# Patient Record
Sex: MALE | Race: WHITE | NOT HISPANIC OR LATINO | Employment: OTHER | URBAN - METROPOLITAN AREA
[De-identification: names, ages, dates, MRNs, and addresses within clinical notes are randomized per-mention and may not be internally consistent; named-entity substitution may affect disease eponyms.]

---

## 2017-04-25 ENCOUNTER — OFFICE VISIT (OUTPATIENT)
Dept: PULMONOLOGY | Facility: CLINIC | Age: 77
End: 2017-04-25

## 2017-04-25 VITALS
TEMPERATURE: 98.4 F | HEART RATE: 64 BPM | BODY MASS INDEX: 26.22 KG/M2 | SYSTOLIC BLOOD PRESSURE: 139 MMHG | RESPIRATION RATE: 16 BRPM | HEIGHT: 68 IN | DIASTOLIC BLOOD PRESSURE: 78 MMHG | WEIGHT: 173 LBS | OXYGEN SATURATION: 91 %

## 2017-04-25 DIAGNOSIS — J84.112 IDIOPATHIC PULMONARY FIBROSIS (HCC): Primary | ICD-10-CM

## 2017-04-25 DIAGNOSIS — Z99.81 DEPENDENCE ON SUPPLEMENTAL OXYGEN: ICD-10-CM

## 2017-04-25 PROCEDURE — 99214 OFFICE O/P EST MOD 30 MIN: CPT | Performed by: INTERNAL MEDICINE

## 2017-04-25 NOTE — PATIENT INSTRUCTIONS
1. Continue to take Perfenidone  2. Continue to have your lab checked by Dr. Fajardo  3. Please ask Dr. Quiroz to send me copies of any evaluation you have (lab studies, stress test, echoes, etc.)  4. I will see you in 4 months and we will obtain a chest x-ray and a breathing test on that date  5. We need to make sure that you have a flu vaccine performed this fall

## 2017-04-25 NOTE — PROGRESS NOTES
Subjective   Zechariah Delarosa is a 77 y.o.  white male former smoker who quit in 1982 (3 packs per day for 25 years). I have followed since 5/18/12 when he was originally sent by Dr. Fajardo and Dr. Quiroz for prominent interstitial markings on chest x-ray and complaints of dyspnea. He had a diffusion impairment, a restrictive defect, and exercise-induced hypoxemia. It was felt that he had IPF over the course was very slow. I initially just began him on some low-dose steroids because he felt so asymptomatic, but when his lung volumes and diffusion capacity continued drop I was eventually able to get him to start Perfenidone. Was actually started 2/3/15 but he subsequently went off of it, only to have it restarted 4/1/15. Her on Perfenidone his status appears fairly stable and when last seen in the office by our nurse practitioner 11/8/16 his pulmonary function studies were stable without any loss. He has oxygen at home which she is to use at night and with activity but he is often noncompliant when he goes outside to walk about, cut his grass etc. He has a pulse oximeter as well but he frequently does not use it.  When last seen I outlined his problems as follows  1. IPF  A. On low-dose prednisone 5 mg daily  B. Remains on Perfenidone since 4/1/15  C. On home oxygen at night but noncompliant with activity  D. Diagnosis based on CT scan and PFTs. No open lung biopsy  E. Mild restrictive defect but severe diffusion impairment  2. History of coronary artery disease  A. MI and stent placement 2002  3. History of cigarette abuse but no obstructive defect  4. Hiatal hernia and GERD  5. Hypertension and hyperlipidemia  6. 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 Dupuytren's contracture    History of Present Illness   Patient has had no problems since last seen in the office 11/8/16. He is chronically short of breath but feels no worse than usual. Denies fever, chills, sweats, anorexia, weight loss. Does  have chronic trace to 1+ lower extremity edema. Tells me he saw Dr. Quiroz about a month ago and was told that his EKG had changed. He is scheduled for a stress test and a number of other studies in about 10 days. The last labs I have available from Dr. Fajardo her CBC and CMP from 2/1/17 and look good    The following portions of the patient's history were reviewed and updated as appropriate: allergies, current medications, past family history, past medical history, past social history, past surgical history and problem list.    Review of Systems   Constitutional: Negative for appetite change, chills, diaphoresis, fatigue, fever and unexpected weight change.   HENT: Negative for congestion, ear discharge, ear pain, hearing loss, postnasal drip, rhinorrhea, sinus pressure, sneezing, sore throat, trouble swallowing and voice change.    Eyes: Negative for visual disturbance.   Respiratory: Positive for shortness of breath. Negative for cough, choking, chest tightness, wheezing and stridor.    Cardiovascular: Negative for chest pain, palpitations and leg swelling.   Gastrointestinal: Negative for abdominal pain, anal bleeding, blood in stool, constipation, diarrhea, nausea and vomiting.   Endocrine: Negative for cold intolerance, heat intolerance, polydipsia and polyuria.   Genitourinary: Negative for decreased urine volume, difficulty urinating, dysuria, frequency, hematuria and urgency.   Musculoskeletal: Negative for arthralgias, back pain, joint swelling and myalgias.   Skin: Negative for pallor and rash.   Allergic/Immunologic: Negative for environmental allergies, food allergies and immunocompromised state.   Neurological: Negative for dizziness, seizures, syncope, speech difficulty, weakness and headaches.   Hematological: Negative for adenopathy. Does not bruise/bleed easily.   Psychiatric/Behavioral: Negative for confusion, decreased concentration and sleep disturbance. The patient is not nervous/anxious.    All  "other systems reviewed and are negative.      Prior to Admission medications    Medication Sig Start Date End Date Taking? Authorizing Provider   aspirin 81 MG tablet Take 1 tablet by mouth daily. 7/1/13  Yes Historical Provider, MD   atorvastatin (LIPITOR) 10 MG tablet Take 1 tablet by mouth every night. 6/25/15  Yes Historical Provider, MD   lisinopril (PRINIVIL,ZESTRIL) 10 MG tablet Take 1 tablet by mouth daily. 7/1/13  Yes Historical Provider, MD   metFORMIN (GLUCOPHAGE) 500 MG tablet Take 500 mg by mouth. Take 1 tablet daily. 10/21/15  Yes Robby Kennedy MD   metoprolol tartrate (LOPRESSOR) 25 MG tablet Take 25 mg by mouth daily.   Yes Historical Provider, MD   nitroglycerin (NITROSTAT) 0.4 MG SL tablet Place  under the tongue. Dissolve 1 tablet under the tongue as needed for chest pain. 7/1/13  Yes Historical Provider, MD   Omega-3 Fatty Acids (FISH OIL) 1000 MG capsule capsule Take  by mouth. 7/1/13  Yes Historical Provider, MD   omeprazole (PriLOSEC) 40 MG capsule Take 1 capsule by mouth daily. 6/8/13  Yes Historical Provider, MD   OXYGEN-HELIUM IN Oxygen; Patient Sig: Oxygen 2L q HS; 0; 25-Jun-2015; Active 6/25/15  Yes Historical Provider, MD   Pirfenidone 267 MG capsule Take 3 capsules by mouth 3 (three) times a day. 6/25/15  Yes Historical Provider, MD   predniSONE (DELTASONE) 5 MG tablet Take 1 tablet by mouth Daily. 11/8/16  Yes NATI Sears       Objective   Blood pressure 139/78, pulse 64, temperature 98.4 °F (36.9 °C), resp. rate 16, height 68\" (172.7 cm), weight 173 lb (78.5 kg), SpO2 91 %.    Flowsheet Rows         First Filed Value    Admission Height  68\" (172.7 cm) Documented at 04/25/2017 1423    Admission Weight  173 lb (78.5 kg) Documented at 04/25/2017 1423        Physical Exam   Constitutional: He is oriented to person, place, and time. He appears well-developed and well-nourished.   Thin elderly white male in no acute distress   HENT:   Head: Normocephalic and atraumatic. "   Right Ear: Hearing and external ear normal.   Left Ear: Hearing and external ear normal.   Nose: Nose normal.   Mouth/Throat: Oropharynx is clear and moist. No oropharyngeal exudate.   Bilateral hearing aids in place   Eyes: Conjunctivae and EOM are normal. Pupils are equal, round, and reactive to light. Right eye exhibits no discharge. Left eye exhibits no discharge. No scleral icterus.   Neck: Normal range of motion. Neck supple. No JVD present. No tracheal deviation present. No thyromegaly present.   Cardiovascular: Normal rate, regular rhythm, normal heart sounds and intact distal pulses.  Exam reveals no gallop and no friction rub.    No murmur heard.  Pulmonary/Chest: Effort normal. No accessory muscle usage or stridor. No apnea, no tachypnea and no bradypnea. No respiratory distress. He has no wheezes. He has no rhonchi. He has rales (coarse bilateral anterior and posterior rales. ). He exhibits no tenderness.   Abdominal: Soft. Bowel sounds are normal. He exhibits no distension and no mass. There is no splenomegaly or hepatomegaly. There is no tenderness. There is no rebound and no guarding. No hernia.   Musculoskeletal: Normal range of motion. He exhibits no edema or tenderness.   Dupuytren's contracture right hand   Lymphadenopathy:        Head (right side): No submandibular adenopathy present.        Head (left side): No submandibular adenopathy present.     He has no cervical adenopathy.        Right: No supraclavicular and no epitrochlear adenopathy present.        Left: No supraclavicular and no epitrochlear adenopathy present.   Neurological: He is alert and oriented to person, place, and time. He has normal reflexes. He displays normal reflexes. No cranial nerve deficit. He exhibits normal muscle tone. Coordination normal.   Skin: Skin is warm and dry. No bruising, no ecchymosis, no petechiae and no rash noted. He is not diaphoretic. No cyanosis or erythema. No pallor. Nails show no clubbing.    Psychiatric: He has a normal mood and affect. His speech is normal and behavior is normal. Judgment and thought content normal.   Nursing note and vitals reviewed.      Assessment/Plan     1. IPF  A. On low-dose prednisone 5 mg daily  B. Remains on Perfenidone since 4/1/15  C. On home oxygen at night but noncompliant with activity  D. Diagnosis based on CT scan and PFTs. No open lung biopsy  E. Mild restrictive defect but severe diffusion impairment  2. History of coronary artery disease  A. MI and stent placement 2002  3. History of cigarette abuse but no obstructive defect  4. Hiatal hernia and GERD  5. Hypertension and hyperlipidemia  6. 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 Dupuytren's contracture    Discussion: Appears quite stable although I am somewhat concerned about what the problem was from a cardiovascular status when he last saw Dr. Quiroz    Plan:  1. Continue Perfenidone and low-dose prednisone  2. We will try to get records from Dr. Quiroz office   3. I will see the patient in 4 months at which time we will check spirometry and chest x-ray  4. Reminded him to get a flu vaccine this fall  5. Continue labs at Dr. Fajardo's office    Robby Olivas MD  Pulmonary and Critical Care Medicine  04/25/17 3:25 PM

## 2017-06-16 ENCOUNTER — HOSPITAL ENCOUNTER (OUTPATIENT)
Dept: OTHER | Age: 77
Discharge: HOME | End: 2017-06-16
Attending: INTERNAL MEDICINE | Admitting: INTERNAL MEDICINE

## 2017-06-22 ENCOUNTER — TELEPHONE (OUTPATIENT)
Dept: PULMONOLOGY | Facility: CLINIC | Age: 77
End: 2017-06-22

## 2017-06-22 NOTE — TELEPHONE ENCOUNTER
Antonio Cox, nurse practitioner with [facility], called as Mr. Delarosa was admitted to the hospital at [facility] last night.  He he has had worsening shortness of breath and increased supplemental oxygen needs.  A CT of the chest was performed and he has received some IV steroids.  Antonio is discharging him today on a steroid taper.  Mr. Delarosa has a follow-up appointment scheduled in August but Antonio feels like he needs to be seen sooner.  I let him know that I will see him next week with PFTs.  Mr. Delarosa's daughter will call to schedule this appointment.

## 2017-06-29 ENCOUNTER — OFFICE VISIT (OUTPATIENT)
Dept: PULMONOLOGY | Facility: CLINIC | Age: 77
End: 2017-06-29

## 2017-06-29 VITALS
HEART RATE: 69 BPM | OXYGEN SATURATION: 92 % | SYSTOLIC BLOOD PRESSURE: 112 MMHG | BODY MASS INDEX: 24.67 KG/M2 | TEMPERATURE: 97.8 F | WEIGHT: 162.8 LBS | HEIGHT: 68 IN | DIASTOLIC BLOOD PRESSURE: 58 MMHG | RESPIRATION RATE: 18 BRPM

## 2017-06-29 DIAGNOSIS — Z99.81 DEPENDENCE ON SUPPLEMENTAL OXYGEN: ICD-10-CM

## 2017-06-29 DIAGNOSIS — J84.112 IDIOPATHIC PULMONARY FIBROSIS (HCC): ICD-10-CM

## 2017-06-29 DIAGNOSIS — R06.02 SHORTNESS OF BREATH: Primary | ICD-10-CM

## 2017-06-29 PROCEDURE — 94200 LUNG FUNCTION TEST (MBC/MVV): CPT | Performed by: NURSE PRACTITIONER

## 2017-06-29 PROCEDURE — 99213 OFFICE O/P EST LOW 20 MIN: CPT | Performed by: NURSE PRACTITIONER

## 2017-06-29 PROCEDURE — 94010 BREATHING CAPACITY TEST: CPT | Performed by: NURSE PRACTITIONER

## 2017-06-29 RX ORDER — ISOSORBIDE MONONITRATE 60 MG/1
1 TABLET, EXTENDED RELEASE ORAL DAILY
COMMUNITY
Start: 2017-06-15

## 2017-06-29 RX ORDER — PREDNISONE 10 MG/1
5 TABLET ORAL
COMMUNITY
Start: 2017-06-22 | End: 2019-02-18 | Stop reason: SDUPTHER

## 2017-06-29 NOTE — PROGRESS NOTES
[facility], [place], operated by [facility] Follow up    CHIEF COMPLAINT    Shortness of breath    HISTORY OF PRESENT ILLNESS    Zechariah Delarosa is a 77 y.o.male here today for a hospital follow-up.  He was hospitalized overnight last week at [facility] for worsening shortness of breath.  A CT of the chest was obtained with results as detailed below. Upon day of discharge I was contacted by the nurse practitioner for his cardiologist, Dr. Quiroz, to see if he could be seen sooner than his next scheduled appointment with Dr. Olivas.  The nurse practitioner felt like if he waited until August he would likely continue to end up in the hospital.  He was discharged home on a pulse dose of prednisone.  He is currently on 20 mg a day and we'll then taper down to 10 mg a day.  He has not noticed much improvement in his breathing with the increased dose of prednisone.  Of note, he was started on Entresto 2 days before he ended up in the hospital.  Mr. Delarosa reports he feels like his breathing is worse due to his heart failure.  He reports he was told his heart is beating weaker than usual.    He has a history of IPF and has been on Esbriet since 4/1/15.  He is also on low-dose prednisone at 5 mg daily.    He has oxygen at home which he was previously just wearing at night and with activity however he is now wearing it at all times.  He wears 2.5 L.  He has a pulse oximeter but does not frequently use it.  He reports when he does check his oxygen saturations he tends to run somewhere around 90-91%.  His DME company told him about a home filling system which he is quite interested in.        Patient Active Problem List   Diagnosis   • Idiopathic pulmonary fibrosis   • CAD (coronary artery disease)   • Hiatal hernia with GERD   • Hypertension   • Hyperlipidemia   • Skin cancer   • Dupuytren's contracture   • Dependence on supplemental oxygen   • Interstitial lung disease       No Known Allergies    Current Outpatient Prescriptions:   •   aspirin 81 MG tablet, Take 1 tablet by mouth daily., Disp: , Rfl:   •  atorvastatin (LIPITOR) 10 MG tablet, Take 1 tablet by mouth every night., Disp: , Rfl:   •  isosorbide mononitrate (IMDUR) 60 MG 24 hr tablet, Take 1 tablet by mouth Daily., Disp: , Rfl:   •  metFORMIN (GLUCOPHAGE) 500 MG tablet, Take 500 mg by mouth. Take 1 tablet daily., Disp: , Rfl:   •  metoprolol tartrate (LOPRESSOR) 25 MG tablet, Take 25 mg by mouth daily., Disp: , Rfl:   •  nitroglycerin (NITROSTAT) 0.4 MG SL tablet, Place  under the tongue. Dissolve 1 tablet under the tongue as needed for chest pain., Disp: , Rfl:   •  Omega-3 Fatty Acids (FISH OIL) 1000 MG capsule capsule, Take  by mouth., Disp: , Rfl:   •  omeprazole (PriLOSEC) 40 MG capsule, Take 1 capsule by mouth daily., Disp: , Rfl:   •  OXYGEN-HELIUM IN, Oxygen; Patient Sig: Oxygen 2L q HS; 0; 25-Jun-2015; Active, Disp: , Rfl:   •  Pirfenidone 267 MG capsule, Take 3 capsules by mouth 3 (three) times a day., Disp: , Rfl:   •  predniSONE (DELTASONE) 10 MG tablet, , Disp: , Rfl:   •  sacubitril-valsartan (ENTRESTO) 24-26 MG tablet, Take 1 tablet by mouth 2 (Two) Times a Day., Disp: , Rfl:   MEDICATION LIST AND ALLERGIES REVIEWED.    Social History   Substance Use Topics   • Smoking status: Former Smoker     Packs/day: 3.00     Years: 25.00     Types: Cigarettes     Quit date: 1982   • Smokeless tobacco: Not on file      Comment: Former smoker of 3 ppd for 25 years but quit in 1982.   • Alcohol use Yes      Comment: Only rare occasions       FAMILY AND SOCIAL HISTORY REVIEWED.    Review of Systems   Constitutional: Negative for chills, fatigue, fever and unexpected weight change.   HENT: Negative for congestion, nosebleeds, postnasal drip, rhinorrhea, sinus pressure and trouble swallowing.    Respiratory: Positive for shortness of breath. Negative for cough, chest tightness and wheezing.    Cardiovascular: Negative for chest pain and leg swelling.   Gastrointestinal: Negative for  "abdominal pain, constipation, diarrhea, nausea and vomiting.   Genitourinary: Negative for dysuria, frequency, hematuria and urgency.   Musculoskeletal: Negative for myalgias.   Neurological: Negative for dizziness, weakness, numbness and headaches.   All other systems reviewed and are negative.  .    /58  Pulse 69  Temp 97.8 °F (36.6 °C)  Resp 18  Ht 68\" (172.7 cm)  Wt 162 lb 12.8 oz (73.8 kg)  SpO2 92% Comment: 2L P/D  BMI 24.75 kg/m2    Physical Exam   Constitutional: He is oriented to person, place, and time. He appears well-developed. No distress.   HENT:   Head: Normocephalic and atraumatic.   Neck: Normal range of motion. Neck supple.   Cardiovascular: Normal rate, regular rhythm and normal heart sounds.  Exam reveals no friction rub.    No murmur heard.  Pulmonary/Chest: Effort normal. No respiratory distress. He has no wheezes.   Diminished breath sounds   Musculoskeletal: Normal range of motion. He exhibits no edema.   Neurological: He is alert and oriented to person, place, and time.   Skin: Skin is warm and dry. No erythema.   Psychiatric: He has a normal mood and affect.   Vitals reviewed.        RESULTS    Spirometry  in the office today, read by me: FEV1 1.74 L (64%), FVC 2.00 L (53%), FEV1 FVC ratio 87%.  Restrictive pattern with a significant decline in his FEV1 and FVC from previous testing.    CT of the chest performed on 6/21/17 showed severe interstitial lung disease with reactive adenopathy.    PROBLEM LIST    Problem List Items Addressed This Visit        Respiratory    Idiopathic pulmonary fibrosis    Overview     A. Restrictive defect stable to slightly improved on 10/21/2015 as is diffusion capacity.  B. Exercise induced hypoxemia but not below 89%.  C. Atypical progression as it is quite slow  D. Never received open lung biopsy  E. On pirfenidone since 04/01/2015            Other    Dependence on supplemental oxygen      Other Visit Diagnoses     Shortness of breath    -  " Primary    Relevant Orders    Spirometry Without Bronchodilator (Completed)            DISCUSSION  His IPF had previously been quite slow in its progression.  However his predicted FVC has declined to 53% from a previous measurement of 80%.  We will continue to monitor with PFTs at his next appointment.  He is to complete his prednisone taper as prescribed and then resume his daily 5 mg dose.  He has not noticed much of a difference being on a higher dose of prednisone so I do not think it would be beneficial for him to increase his dose at this time  Continue Esbriet with continued labs at Dr. Fajardo's office  We will arrange a home fill system for him through his [employer] company, [employer].  Follow-up with Dr. Olivas in August as scheduled    Kelle Bonds, APRN  06/29/20172:01 PM  Electronically signed     Please note that portions of this note were completed with a voice recognition program. Efforts were made to edit the dictations, but occasionally words are mistranscribed.      CC: Jerod Fajardo MD

## 2017-07-06 ENCOUNTER — TELEPHONE (OUTPATIENT)
Dept: PULMONOLOGY | Facility: CLINIC | Age: 77
End: 2017-07-06

## 2017-07-06 NOTE — TELEPHONE ENCOUNTER
Shanae contacted me to report that Mr Delarosa is having worsening shortness of breath and fatigue. He was recently hospitalized at [facility] for similar complaints. I saw him last week as a hospital follow-up. At that time he reported no noticeable difference in his breathing with his increased dose of prednisone, so I declined to increase his dose.    She reports that he has a follow-up with his cardiologist, Dr. Quiroz next Friday.  I recommended that she contact our office to see if he could be seen sooner or if they had any recommendations as it sounds like some of his could be related to his heart failure.

## 2017-07-13 ENCOUNTER — DOCUMENTATION (OUTPATIENT)
Dept: PULMONOLOGY | Facility: CLINIC | Age: 77
End: 2017-07-13

## 2017-07-13 NOTE — PROGRESS NOTES
Patients daughter called wanting cough medication called in for her father. Eli Fontaine spoke with daughter and informed her that they would need to follow up with cardiologist they seen yesterday regarding the cough medication.

## 2017-07-17 ENCOUNTER — HOSPITAL ENCOUNTER (OUTPATIENT)
Dept: OTHER | Age: 77
Discharge: HOME | End: 2017-07-17
Attending: NURSE PRACTITIONER | Admitting: NURSE PRACTITIONER

## 2017-07-21 ENCOUNTER — TELEPHONE (OUTPATIENT)
Dept: PULMONOLOGY | Facility: CLINIC | Age: 77
End: 2017-07-21

## 2017-07-21 NOTE — TELEPHONE ENCOUNTER
Spoke with Shanae today regarding her dad Zechariah Delarosa. Pts daughter called wanting us to increase prednisone Rx. Consulted Kelle Bonds, . Vamshi had seen Mr. Delarosa back in June where pt stated that increased prednisone Rx showed no change in his breathing so she declined to increase at the time. I informed that daughter that pt has an appointment coming up in August with Dr. Olivas and that she should consult with him at that time. She understood and had no further questions.

## 2017-08-16 ENCOUNTER — OFFICE VISIT (OUTPATIENT)
Dept: PULMONOLOGY | Facility: CLINIC | Age: 77
End: 2017-08-16

## 2017-08-16 VITALS
HEART RATE: 88 BPM | OXYGEN SATURATION: 94 % | HEIGHT: 68 IN | RESPIRATION RATE: 16 BRPM | TEMPERATURE: 98.7 F | BODY MASS INDEX: 23.76 KG/M2 | WEIGHT: 156.8 LBS | DIASTOLIC BLOOD PRESSURE: 70 MMHG | SYSTOLIC BLOOD PRESSURE: 128 MMHG

## 2017-08-16 DIAGNOSIS — J84.112 IDIOPATHIC PULMONARY FIBROSIS (HCC): Primary | ICD-10-CM

## 2017-08-16 PROCEDURE — 99214 OFFICE O/P EST MOD 30 MIN: CPT | Performed by: INTERNAL MEDICINE

## 2017-08-16 PROCEDURE — 71020 CHG CHEST X-RAY 2 VW: CPT | Performed by: INTERNAL MEDICINE

## 2017-08-16 PROCEDURE — 94010 BREATHING CAPACITY TEST: CPT | Performed by: INTERNAL MEDICINE

## 2017-08-16 RX ORDER — METOPROLOL SUCCINATE 25 MG/1
1 TABLET, EXTENDED RELEASE ORAL DAILY
COMMUNITY
Start: 2017-05-24 | End: 2017-08-16

## 2017-08-16 RX ORDER — POTASSIUM CHLORIDE 20 MEQ/1
1 TABLET, EXTENDED RELEASE ORAL DAILY
COMMUNITY
Start: 2017-08-13

## 2017-08-16 RX ORDER — PREDNISONE 1 MG/1
5 TABLET ORAL DAILY
Qty: 90 TABLET | Refills: 3 | Status: SHIPPED | OUTPATIENT
Start: 2017-08-16 | End: 2018-09-24 | Stop reason: SDUPTHER

## 2017-08-16 NOTE — PROGRESS NOTES
Subjective   Zechariah Delarosa is a 77 y.o.  white male remote smoker who quit in 1982. He has been followed since 5/18/12 for findings of interstitial lung disease and dyspnea. He was felt to have IPF but very slow progression. When lung volumes and diffusion capacity continued drop he agreed to start Perfenidone 2/3/15. He stopped however and it was restarted on 4/1/15. PFTs appeared stable initially and there was no loss on PFTs on his pulmonary function study of 11/8/16. He was only requiring oxygen at night and with activity (although often did not wear it when he should). I last saw the patient in the office 4/25/17 and outlined his problems as follows:  1. IPF  A. On low-dose prednisone 5 mg daily  B. Remains on Perfenidone since 4/1/15  C. On home oxygen at night but noncompliant with activity  D. Diagnosis based on CT scan and PFTs. No open lung biopsy  E. Mild restrictive defect but severe diffusion impairment  2. History of coronary artery disease  A. MI and stent placement 2002  3. History of cigarette abuse but no obstructive defect  4. Hiatal hernia and GERD  5. Hypertension and hyperlipidemia  6. 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 Dupuytren's contracture      He appeared stable clinically so was just continued on Perfenidone, low-dose prednisone, and oxygen.    History of Present Illness   Patient was not scheduled to be seen until this visit but presented 6/29/17 and saw Kelle DAMIAN. He had been referred by Dr. Quiroz because he had been hospitalized the previous week at [facility] for increasing dyspnea. He had apparently been noted to have increased infiltrates and was given a pulse of steroids. He had not noted any improvement and was having to use oxygen 24 hours a day. PFTs were obtained and his FEV1 had dropped to 1.74 (64%). CT scan from 6/21/17 showed severe interstitial disease with reactive adenopathy. He was told to continue his steroid taper to his usual  5 mg dose and to keep his appointment with me today. Was also to continue Perfenidone area he now notes dyspnea with just about any activity even getting to the bathroom. He is wearing his oxygen 24 hours a day. He does not use his pulse oximeter great deal so I do not know if he is keeping adequately saturated. He has no fever, chills, sweats, anorexia but it is noted he has dropped his weight from 174 pounds in November 2016 to today's weight of 156.    The following portions of the patient's history were reviewed and updated as appropriate: allergies, current medications, past family history, past medical history, past social history, past surgical history and problem list.    Review of Systems   Constitutional: Positive for activity change (decreased due to dyspnea) and fatigue. Negative for appetite change, chills, diaphoresis, fever and unexpected weight change.   HENT: Negative for congestion, ear discharge, ear pain, hearing loss, postnasal drip, rhinorrhea, sinus pressure, sneezing, sore throat, trouble swallowing and voice change.    Eyes: Negative for visual disturbance.   Respiratory: Positive for shortness of breath. Negative for cough, choking, chest tightness, wheezing and stridor.    Cardiovascular: Negative for chest pain, palpitations and leg swelling.   Gastrointestinal: Negative for abdominal pain, anal bleeding, blood in stool, constipation, diarrhea, nausea and vomiting.   Endocrine: Negative for cold intolerance, heat intolerance, polydipsia and polyuria.   Genitourinary: Negative for decreased urine volume, difficulty urinating, dysuria, frequency, hematuria and urgency.   Musculoskeletal: Negative for arthralgias, back pain, joint swelling and myalgias.   Skin: Negative for pallor and rash.   Allergic/Immunologic: Negative for environmental allergies, food allergies and immunocompromised state.   Neurological: Positive for weakness. Negative for dizziness, seizures, syncope, speech  difficulty and headaches.   Hematological: Negative for adenopathy. Does not bruise/bleed easily.   Psychiatric/Behavioral: Negative for confusion, decreased concentration and sleep disturbance. The patient is not nervous/anxious.    All other systems reviewed and are negative.      Prior to Admission medications    Medication Sig Start Date End Date Taking? Authorizing Provider   aspirin 81 MG tablet Take 1 tablet by mouth daily. 7/1/13  Yes Historical Provider, MD   atorvastatin (LIPITOR) 10 MG tablet Take 1 tablet by mouth every night. 6/25/15  Yes Historical Provider, MD   isosorbide mononitrate (IMDUR) 60 MG 24 hr tablet Take 1 tablet by mouth Daily. 6/15/17  Yes Historical Provider, MD   metFORMIN (GLUCOPHAGE) 500 MG tablet Take 500 mg by mouth. Take 1 tablet daily. 10/21/15  Yes Robby Kennedy MD   metoprolol tartrate (LOPRESSOR) 25 MG tablet Take 25 mg by mouth daily.   Yes Historical Provider, MD   nitroglycerin (NITROSTAT) 0.4 MG SL tablet Place  under the tongue. Dissolve 1 tablet under the tongue as needed for chest pain. 7/1/13  Yes Historical Provider, MD   Omega-3 Fatty Acids (FISH OIL) 1000 MG capsule capsule Take  by mouth. 7/1/13  Yes Historical Provider, MD   omeprazole (PriLOSEC) 40 MG capsule Take 1 capsule by mouth daily. 6/8/13  Yes Historical Provider, MD   OXYGEN-HELIUM IN Oxygen; Patient Sig: Oxygen 2L q HS; 0; 25-Jun-2015; Active 6/25/15  Yes Historical Provider, MD   Pirfenidone 267 MG capsule Take 3 capsules by mouth 3 (three) times a day. 6/25/15  Yes Historical Provider, MD   potassium chloride (K-DUR,KLOR-CON) 20 MEQ CR tablet 1 tablet Daily. 8/13/17  Yes Historical Provider, MD   predniSONE (DELTASONE) 10 MG tablet 5 mg. 6/22/17  Yes Historical Provider, MD   sacubitril-valsartan (ENTRESTO) 24-26 MG tablet Take 1 tablet by mouth 2 (Two) Times a Day.   Yes Historical Provider, MD   metoprolol succinate XL (TOPROL-XL) 25 MG 24 hr tablet 1 tablet Daily. 5/24/17 8/16/17   "Historical Provider, MD       Objective   Blood pressure 128/70, pulse 88, temperature 98.7 °F (37.1 °C), resp. rate 16, height 68\" (172.7 cm), weight 156 lb 12.8 oz (71.1 kg), SpO2 94 %, peak flow (!) 3 L/min.    Flowsheet Rows         First Filed Value    Admission Height  68\" (172.7 cm) Documented at 08/16/2017 1219    Admission Weight  156 lb 12.8 oz (71.1 kg) Documented at 08/16/2017 1219        Physical Exam   Constitutional: He is oriented to person, place, and time. He appears well-developed.   Thin elderly white male who is obviously lost weight   HENT:   Head: Normocephalic and atraumatic.   Right Ear: Hearing and external ear normal.   Left Ear: Hearing and external ear normal.   Nose: Nose normal.   Mouth/Throat: Oropharynx is clear and moist. No oropharyngeal exudate.   Eyes: Conjunctivae and EOM are normal. Pupils are equal, round, and reactive to light. Right eye exhibits no discharge. Left eye exhibits no discharge. No scleral icterus.   Neck: Normal range of motion. Neck supple. No JVD present. No tracheal deviation present. No thyromegaly present.   Bilateral hearing aids in place   Cardiovascular: Normal rate, regular rhythm, normal heart sounds and intact distal pulses.  Exam reveals no gallop and no friction rub.    No murmur heard.  Pulmonary/Chest: Effort normal. No accessory muscle usage or stridor. No apnea, no tachypnea and no bradypnea. No respiratory distress. He has no wheezes. He has no rhonchi. He has rales (coarse bilateral anterior and posterior rales). He exhibits no tenderness.   Abdominal: Soft. Bowel sounds are normal. He exhibits no distension and no mass. There is no splenomegaly or hepatomegaly. There is no tenderness. There is no rebound and no guarding. No hernia.   Musculoskeletal: Normal range of motion. He exhibits no edema, tenderness or deformity.   Dupuytrens contracture right hand   Lymphadenopathy:        Head (right side): No submandibular adenopathy present.        " Head (left side): No submandibular adenopathy present.     He has no cervical adenopathy.        Right: No supraclavicular and no epitrochlear adenopathy present.        Left: No supraclavicular and no epitrochlear adenopathy present.   Neurological: He is alert and oriented to person, place, and time. He has normal reflexes. He displays normal reflexes. No cranial nerve deficit. He exhibits normal muscle tone. Coordination normal.   Skin: Skin is warm and dry. No bruising, no ecchymosis, no petechiae and no rash noted. He is not diaphoretic. No cyanosis or erythema. No pallor. Nails show no clubbing.   Psychiatric: He has a normal mood and affect. His speech is normal and behavior is normal. Judgment and thought content normal.   Nursing note and vitals reviewed.    Pulmonary function studies: FVC 1.9 (50%), FEV1 1.68 (62%), FEV1/FVC ratio 88%    Assessment/Plan   1. IPF  A. On low-dose prednisone 5 mg daily  B. Remains on Perfenidone since 4/1/15  C. On home oxygen at night but noncompliant with activity  D. Diagnosis based on CT scan and PFTs. No open lung biopsy  E. Mild restrictive defect but severe diffusion impairment  2. History of coronary artery disease  A. MI and stent placement 2002  3. History of cigarette abuse but no obstructive defect  4. Hiatal hernia and GERD  5. Hypertension and hyperlipidemia  6. 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 Dupuytren's contracture     Discussion: It is apparent that his anti-fibrotic agent has not helped. His PFTs are rapidly deteriorating and he is now losing weight which is a terrible prognostic sign. Spent a long time talking to him today about the natural progression of IPF and that it was irreversible. I recommended hospice. Wife and daughter obviously upset that the patient took well. I left a message with Dr. Fajardo's office regarding the situation, and contacted hospice directly myself. They will look in on Mr. Delarosa Thursday or Friday. I told the  patient that I had very little to offer from a therapeutic standpoint and I felt that comfort measures were of utmost importance at this time. I asked him what his wishes were regarding resuscitation and told him he should make this decision ahead of time rather than leaving to his family.    Plan:  1. Discontinue Perfenidone  2. Continue low-dose prednisone  3. I will see the patient on an as-needed basis  4. Hospice will look in on him in 1-2 days    Robby Olivas MD  Pulmonary and Critical Care Medicine  08/16/17 2:19 PM

## 2017-08-16 NOTE — PATIENT INSTRUCTIONS
It appears that your lung situation has rapidly deteriorated indicating that your idiopathic pulmonary fibrosis is not responsive to therapy. There is been a rapid drop in lung capacity since November 2016 despite Perfenidone and I would not continue to take it. I do not think your deterioration is due to heart disease but rather to progressive fibrosis. Unfortunately there is no specific additional therapy and it appears that you are going to decline rapidly. For this reason I have contacted hospice and have left a message for Dr. Fajardo regarding this situation. I would recommend enrolling with hospice so we can focus on your comfort as the situation worsens. This disease process is irreversible and I would not recommend additional aggressive measures even should they be necessary to prolong life (in other words would not recommend intubation, ventilatory support, or resuscitative measures).    I would be happy to answer any questions you have any time, but I do not want to make you travel to [place] if I have no further therapeutic options to offer. For that reason I did not schedule follow-up, but instead will leave that to your discretion.

## 2018-04-06 ENCOUNTER — OFFICE VISIT (OUTPATIENT)
Dept: PRIMARY CARE CLINIC | Age: 78
End: 2018-04-06
Payer: MEDICARE

## 2018-04-06 VITALS
DIASTOLIC BLOOD PRESSURE: 56 MMHG | HEIGHT: 69 IN | BODY MASS INDEX: 26.36 KG/M2 | SYSTOLIC BLOOD PRESSURE: 96 MMHG | WEIGHT: 178 LBS | HEART RATE: 105 BPM | OXYGEN SATURATION: 96 %

## 2018-04-06 DIAGNOSIS — J44.1 ACUTE EXACERBATION OF CHRONIC OBSTRUCTIVE PULMONARY DISEASE (COPD) (HCC): Primary | ICD-10-CM

## 2018-04-06 PROCEDURE — 4004F PT TOBACCO SCREEN RCVD TLK: CPT | Performed by: NURSE PRACTITIONER

## 2018-04-06 PROCEDURE — G8427 DOCREV CUR MEDS BY ELIG CLIN: HCPCS | Performed by: NURSE PRACTITIONER

## 2018-04-06 PROCEDURE — 99213 OFFICE O/P EST LOW 20 MIN: CPT | Performed by: NURSE PRACTITIONER

## 2018-04-06 PROCEDURE — 3023F SPIROM DOC REV: CPT | Performed by: NURSE PRACTITIONER

## 2018-04-06 PROCEDURE — G8419 CALC BMI OUT NRM PARAM NOF/U: HCPCS | Performed by: NURSE PRACTITIONER

## 2018-04-06 PROCEDURE — G8926 SPIRO NO PERF OR DOC: HCPCS | Performed by: NURSE PRACTITIONER

## 2018-04-06 PROCEDURE — 96372 THER/PROPH/DIAG INJ SC/IM: CPT | Performed by: NURSE PRACTITIONER

## 2018-04-06 PROCEDURE — 1123F ACP DISCUSS/DSCN MKR DOCD: CPT | Performed by: NURSE PRACTITIONER

## 2018-04-06 PROCEDURE — G8598 ASA/ANTIPLAT THER USED: HCPCS | Performed by: NURSE PRACTITIONER

## 2018-04-06 PROCEDURE — 4040F PNEUMOC VAC/ADMIN/RCVD: CPT | Performed by: NURSE PRACTITIONER

## 2018-04-06 RX ORDER — CEFTRIAXONE SODIUM 250 MG/1
1000 INJECTION, POWDER, FOR SOLUTION INTRAMUSCULAR; INTRAVENOUS ONCE
Status: COMPLETED | OUTPATIENT
Start: 2018-04-06 | End: 2018-04-06

## 2018-04-06 RX ORDER — CEFDINIR 300 MG/1
300 CAPSULE ORAL 2 TIMES DAILY
Qty: 20 CAPSULE | Refills: 0 | Status: SHIPPED | OUTPATIENT
Start: 2018-04-06 | End: 2018-04-16

## 2018-04-06 RX ORDER — ISOSORBIDE MONONITRATE 60 MG/1
1 TABLET, EXTENDED RELEASE ORAL
COMMUNITY
Start: 2017-06-15

## 2018-04-06 RX ORDER — METHYLPREDNISOLONE SODIUM SUCCINATE 125 MG/2ML
125 INJECTION, POWDER, LYOPHILIZED, FOR SOLUTION INTRAMUSCULAR; INTRAVENOUS ONCE
Status: COMPLETED | OUTPATIENT
Start: 2018-04-06 | End: 2018-04-06

## 2018-04-06 RX ORDER — NITROGLYCERIN 0.4 MG/1
TABLET SUBLINGUAL
COMMUNITY
Start: 2013-07-01

## 2018-04-06 RX ADMIN — CEFTRIAXONE SODIUM 1000 MG: 250 INJECTION, POWDER, FOR SOLUTION INTRAMUSCULAR; INTRAVENOUS at 17:55

## 2018-04-06 RX ADMIN — METHYLPREDNISOLONE SODIUM SUCCINATE 125 MG: 125 INJECTION, POWDER, LYOPHILIZED, FOR SOLUTION INTRAMUSCULAR; INTRAVENOUS at 17:56

## 2018-09-24 RX ORDER — PREDNISONE 1 MG/1
TABLET ORAL
Qty: 90 TABLET | Refills: 0 | Status: SHIPPED | OUTPATIENT
Start: 2018-09-24 | End: 2018-12-05 | Stop reason: SDUPTHER

## 2018-11-06 ENCOUNTER — APPOINTMENT (OUTPATIENT)
Dept: GENERAL RADIOLOGY | Facility: HOSPITAL | Age: 78
End: 2018-11-06
Payer: MEDICARE

## 2018-11-06 ENCOUNTER — HOSPITAL ENCOUNTER (EMERGENCY)
Facility: HOSPITAL | Age: 78
Discharge: OTHER INSTITUTION | End: 2018-11-06
Attending: EMERGENCY MEDICINE
Payer: MEDICARE

## 2018-11-06 VITALS
HEART RATE: 108 BPM | DIASTOLIC BLOOD PRESSURE: 61 MMHG | HEIGHT: 68 IN | RESPIRATION RATE: 43 BRPM | OXYGEN SATURATION: 98 % | BODY MASS INDEX: 26.07 KG/M2 | TEMPERATURE: 98.2 F | WEIGHT: 172 LBS | SYSTOLIC BLOOD PRESSURE: 120 MMHG

## 2018-11-06 DIAGNOSIS — R06.00 DYSPNEA, UNSPECIFIED TYPE: Primary | ICD-10-CM

## 2018-11-06 DIAGNOSIS — R09.02 HYPOXIA: ICD-10-CM

## 2018-11-06 DIAGNOSIS — R00.0 SINUS TACHYCARDIA: ICD-10-CM

## 2018-11-06 LAB
A/G RATIO: 0.9 (ref 0.8–2)
ALBUMIN SERPL-MCNC: 4.2 G/DL (ref 3.4–4.8)
ALP BLD-CCNC: 88 U/L (ref 25–100)
ALT SERPL-CCNC: 21 U/L (ref 4–36)
ANION GAP SERPL CALCULATED.3IONS-SCNC: 19 MMOL/L (ref 3–16)
AST SERPL-CCNC: 31 U/L (ref 8–33)
BASE EXCESS ARTERIAL: -0.4 MMOL/L (ref -3–3)
BASOPHILS ABSOLUTE: 0.1 K/UL (ref 0–0.1)
BASOPHILS RELATIVE PERCENT: 0.9 %
BILIRUB SERPL-MCNC: 0.9 MG/DL (ref 0.3–1.2)
BUN BLDV-MCNC: 15 MG/DL (ref 6–20)
CALCIUM SERPL-MCNC: 9.7 MG/DL (ref 8.5–10.5)
CHLORIDE BLD-SCNC: 98 MMOL/L (ref 98–107)
CO2: 23 MMOL/L (ref 20–30)
CREAT SERPL-MCNC: 1.2 MG/DL (ref 0.4–1.2)
EOSINOPHILS ABSOLUTE: 0.4 K/UL (ref 0–0.4)
EOSINOPHILS RELATIVE PERCENT: 2.8 %
FIO2: 0.21 %
GFR AFRICAN AMERICAN: >59
GFR NON-AFRICAN AMERICAN: 58
GLOBULIN: 4.6 G/DL
GLUCOSE BLD-MCNC: 202 MG/DL (ref 74–106)
HCO3 ARTERIAL: 21.4 MMOL/L (ref 22–26)
HCT VFR BLD CALC: 41.5 % (ref 40–54)
HEMOGLOBIN: 13.5 G/DL (ref 13–18)
IMMATURE GRANULOCYTES #: 0.1 K/UL
IMMATURE GRANULOCYTES %: 0.7 % (ref 0–5)
INR BLD: 1.1 (ref 0.86–1.14)
LACTIC ACID: 4.4 MMOL/L (ref 0.4–2)
LYMPHOCYTES ABSOLUTE: 3.7 K/UL (ref 1.5–4)
LYMPHOCYTES RELATIVE PERCENT: 26.9 %
MCH RBC QN AUTO: 31.6 PG (ref 27–32)
MCHC RBC AUTO-ENTMCNC: 32.5 G/DL (ref 31–35)
MCV RBC AUTO: 97.2 FL (ref 80–100)
MONOCYTES ABSOLUTE: 0.8 K/UL (ref 0.2–0.8)
MONOCYTES RELATIVE PERCENT: 5.9 %
NEUTROPHILS ABSOLUTE: 8.7 K/UL (ref 2–7.5)
NEUTROPHILS RELATIVE PERCENT: 62.8 %
O2 SAT, ARTERIAL: ABNORMAL %
O2 THERAPY: ABNORMAL
PCO2 ARTERIAL: 27.8 MMHG (ref 35–45)
PDW BLD-RTO: 12.4 % (ref 11–16)
PH ARTERIAL: 7.5 (ref 7.35–7.45)
PLATELET # BLD: 331 K/UL (ref 150–400)
PMV BLD AUTO: 10.5 FL (ref 6–10)
PO2 ARTERIAL: 28.6 MMHG (ref 80–100)
POTASSIUM SERPL-SCNC: 3.3 MMOL/L (ref 3.4–5.1)
PRO-BNP: 301 PG/ML (ref 0–1800)
PROTHROMBIN TIME: 11.4 SEC (ref 9.5–10.9)
RBC # BLD: 4.27 M/UL (ref 4.5–6)
SODIUM BLD-SCNC: 140 MMOL/L (ref 136–145)
TCO2 ARTERIAL: 22.3 MMOL/L
TOTAL PROTEIN: 8.8 G/DL (ref 6.4–8.3)
TROPONIN: <0.3 NG/ML
WBC # BLD: 13.8 K/UL (ref 4–11)

## 2018-11-06 PROCEDURE — 82803 BLOOD GASES ANY COMBINATION: CPT

## 2018-11-06 PROCEDURE — 84484 ASSAY OF TROPONIN QUANT: CPT

## 2018-11-06 PROCEDURE — 71045 X-RAY EXAM CHEST 1 VIEW: CPT

## 2018-11-06 PROCEDURE — 99285 EMERGENCY DEPT VISIT HI MDM: CPT

## 2018-11-06 PROCEDURE — 80053 COMPREHEN METABOLIC PANEL: CPT

## 2018-11-06 PROCEDURE — 36600 WITHDRAWAL OF ARTERIAL BLOOD: CPT

## 2018-11-06 PROCEDURE — 85610 PROTHROMBIN TIME: CPT

## 2018-11-06 PROCEDURE — 36415 COLL VENOUS BLD VENIPUNCTURE: CPT

## 2018-11-06 PROCEDURE — 96374 THER/PROPH/DIAG INJ IV PUSH: CPT

## 2018-11-06 PROCEDURE — 96375 TX/PRO/DX INJ NEW DRUG ADDON: CPT

## 2018-11-06 PROCEDURE — 83880 ASSAY OF NATRIURETIC PEPTIDE: CPT

## 2018-11-06 PROCEDURE — 83605 ASSAY OF LACTIC ACID: CPT

## 2018-11-06 PROCEDURE — 87040 BLOOD CULTURE FOR BACTERIA: CPT

## 2018-11-06 PROCEDURE — 93005 ELECTROCARDIOGRAM TRACING: CPT

## 2018-11-06 PROCEDURE — 85025 COMPLETE CBC W/AUTO DIFF WBC: CPT

## 2018-11-06 PROCEDURE — 6360000002 HC RX W HCPCS: Performed by: EMERGENCY MEDICINE

## 2018-11-06 RX ORDER — METHYLPREDNISOLONE SODIUM SUCCINATE 125 MG/2ML
125 INJECTION, POWDER, LYOPHILIZED, FOR SOLUTION INTRAMUSCULAR; INTRAVENOUS ONCE
Status: COMPLETED | OUTPATIENT
Start: 2018-11-06 | End: 2018-11-06

## 2018-11-06 RX ORDER — LORAZEPAM 2 MG/ML
1 INJECTION INTRAMUSCULAR ONCE
Status: COMPLETED | OUTPATIENT
Start: 2018-11-06 | End: 2018-11-06

## 2018-11-06 RX ADMIN — METHYLPREDNISOLONE SODIUM SUCCINATE 125 MG: 125 INJECTION, POWDER, FOR SOLUTION INTRAMUSCULAR; INTRAVENOUS at 14:32

## 2018-11-06 RX ADMIN — LORAZEPAM 1 MG: 2 INJECTION, SOLUTION INTRAMUSCULAR; INTRAVENOUS at 14:33

## 2018-11-06 ASSESSMENT — ENCOUNTER SYMPTOMS
WHEEZING: 0
SHORTNESS OF BREATH: 1
EYE PAIN: 0
EYE DISCHARGE: 0
BACK PAIN: 0
DIARRHEA: 0
COUGH: 0
TROUBLE SWALLOWING: 0
NAUSEA: 0
RHINORRHEA: 0
EYE REDNESS: 0
ABDOMINAL PAIN: 0
SINUS PRESSURE: 0
VOMITING: 0
CHEST TIGHTNESS: 0
CONSTIPATION: 0
SORE THROAT: 0

## 2018-11-06 ASSESSMENT — PAIN DESCRIPTION - ORIENTATION: ORIENTATION: MID

## 2018-11-06 ASSESSMENT — PAIN DESCRIPTION - PAIN TYPE: TYPE: ACUTE PAIN

## 2018-11-06 ASSESSMENT — PAIN DESCRIPTION - ONSET: ONSET: GRADUAL

## 2018-11-06 ASSESSMENT — PAIN SCALES - GENERAL: PAINLEVEL_OUTOF10: 5

## 2018-11-06 ASSESSMENT — PAIN DESCRIPTION - LOCATION: LOCATION: CHEST

## 2018-11-06 ASSESSMENT — PAIN DESCRIPTION - DESCRIPTORS: DESCRIPTORS: CONSTANT

## 2018-11-06 ASSESSMENT — PAIN DESCRIPTION - FREQUENCY: FREQUENCY: CONTINUOUS

## 2018-11-06 ASSESSMENT — PAIN DESCRIPTION - PROGRESSION: CLINICAL_PROGRESSION: GRADUALLY WORSENING

## 2018-11-06 NOTE — ED PROVIDER NOTES
[address] [place] [address] Court  eMERGENCY dEPARTMENT eNCOUnter      Pt Name: Francia Light  MRN: [id number]  [place] 1940  Date of evaluation: 11/6/2018  Provider: Darshana Quick MD    [address] [place] [address]       Chief Complaint   Patient presents with    Chest Pain    Shortness of Breath         HISTORY OF PRESENT ILLNESS   (Location/Symptom, Timing/Onset, Context/Setting, Quality, Duration, Modifying Factors, Severity)  Note limiting factors. Francia Light is a 77 y.o. male who presents to the emergency department because of chest pain with shortness of breath. Patient has history of COPD and Pulmonary fibrosis but no longer smokes. He says he has O2 24/7 at 3 liters and he's O2 sats were down to the 30s. No fever. Nursing Notes were reviewed. REVIEW OF SYSTEMS    (2-9 systems for level 4, 10 or more forlevel 5)     Review of Systems   Constitutional: Negative for chills and fever. HENT: Negative for congestion, ear pain, postnasal drip, rhinorrhea, sinus pressure, sneezing, sore throat and trouble swallowing. Eyes: Negative for pain, discharge and redness. Respiratory: Positive for shortness of breath. Negative for cough, chest tightness and wheezing. Cardiovascular: Positive for chest pain. Negative for palpitations and leg swelling. Gastrointestinal: Negative for abdominal pain, constipation, diarrhea, nausea and vomiting. Genitourinary: Negative for dysuria, flank pain, frequency, hematuria and urgency. Musculoskeletal: Negative for back pain, joint swelling and neck pain. Skin: Negative for pallor and rash. Neurological: Negative for dizziness, syncope, weakness, numbness and headaches. Psychiatric/Behavioral: Negative for confusion and hallucinations. The patient is not nervous/anxious.             PAST MEDICAL HISTORY     Past Medical History:   Diagnosis Date    CAD (coronary artery disease)     Diabetes mellitus ([facility]     Emphysema of lung ([facility]    

## 2018-11-06 NOTE — ED NOTES
Pt report given to Gabriela Holt at [employer].      Negrita Garrison RN  11/06/18 [id number]

## 2018-11-11 LAB
BLOOD CULTURE, ROUTINE: NORMAL
CULTURE, BLOOD 2: NORMAL

## 2018-12-05 RX ORDER — PREDNISONE 1 MG/1
TABLET ORAL
Qty: 72 TABLET | Refills: 0 | Status: SHIPPED | OUTPATIENT
Start: 2018-12-05 | End: 2019-02-16 | Stop reason: SDUPTHER

## 2019-02-18 ENCOUNTER — TELEPHONE (OUTPATIENT)
Dept: PULMONOLOGY | Facility: CLINIC | Age: 79
End: 2019-02-18

## 2019-02-18 DIAGNOSIS — J84.112 IDIOPATHIC PULMONARY FIBROSIS (HCC): Primary | ICD-10-CM

## 2019-02-18 RX ORDER — PREDNISONE 1 MG/1
TABLET ORAL
Qty: 72 TABLET | Refills: 0 | Status: SHIPPED | OUTPATIENT
Start: 2019-02-18

## 2019-02-18 RX ORDER — PREDNISONE 10 MG/1
5 TABLET ORAL DAILY
Qty: 30 TABLET | Refills: 4 | Status: SHIPPED | OUTPATIENT
Start: 2019-02-18

## 2019-02-18 NOTE — TELEPHONE ENCOUNTER
Patient requesting refills of prednisone refills x4 submitted to Melanie.Patient last seen 08/2018